# Patient Record
Sex: MALE | Race: ASIAN | NOT HISPANIC OR LATINO | Employment: FULL TIME | URBAN - METROPOLITAN AREA
[De-identification: names, ages, dates, MRNs, and addresses within clinical notes are randomized per-mention and may not be internally consistent; named-entity substitution may affect disease eponyms.]

---

## 2024-01-04 ENCOUNTER — APPOINTMENT (OUTPATIENT)
Dept: HEMATOLOGY/ONCOLOGY | Facility: HOSPITAL | Age: 69
End: 2024-01-04
Payer: MEDICARE

## 2024-03-07 ENCOUNTER — TELEMEDICINE (OUTPATIENT)
Dept: HEMATOLOGY/ONCOLOGY | Facility: HOSPITAL | Age: 69
End: 2024-03-07
Payer: MEDICARE

## 2024-03-07 DIAGNOSIS — C61 MALIGNANT NEOPLASM OF PROSTATE (MULTI): ICD-10-CM

## 2024-03-07 DIAGNOSIS — R97.21 RISING PSA FOLLOWING TREATMENT FOR MALIGNANT NEOPLASM OF PROSTATE: Primary | ICD-10-CM

## 2024-03-07 PROCEDURE — 99213 OFFICE O/P EST LOW 20 MIN: CPT | Performed by: INTERNAL MEDICINE

## 2024-03-07 NOTE — PROGRESS NOTES
Medical Oncology Out Patient Clinic Note    Patient's Name: Irvin Oliver  MRN: [id number]  Virtual Visit: YES    Reason for Visit: FU    HPI: 67 yo male known to us with [facility] with BCR on IAB  Now off ADT and with T recovery  PSA rising but pt is completely asymptomatic      Updated PMHx  None    Review of Systems  13 point review negative    LABS:    Feb 26, 2024 PSA 3.99 and T 219    A/P: BCR after local definitive therapy    Discussed significance of PSA changes and PSADT  Pt reluctant to initiate Rx  We have favored to get a PSA in 6 months - per pt request      Discussed importance of a healthier diet - offered to see Nutritional Services; Also discussed the importance of daily regular exercise (aerobic and resistance differences noted)  Offered to see Supportive Services if needed as well as integrative Oncology and Psychosocial Support    Sen Connell MD, FACP  Chief, Solid Tumor Oncology Division   Medical Oncology  Professor of Medicine and Urology  /[facility]  [facility]

## 2024-03-20 DIAGNOSIS — C61 PROSTATE CANCER (MULTI): ICD-10-CM

## 2024-09-05 ENCOUNTER — TELEMEDICINE (OUTPATIENT)
Dept: HEMATOLOGY/ONCOLOGY | Facility: HOSPITAL | Age: 69
End: 2024-09-05
Payer: MEDICARE

## 2024-09-05 DIAGNOSIS — R97.21 RISING PSA FOLLOWING TREATMENT FOR MALIGNANT NEOPLASM OF PROSTATE: ICD-10-CM

## 2024-09-05 DIAGNOSIS — C61 MALIGNANT NEOPLASM OF PROSTATE (MULTI): ICD-10-CM

## 2024-09-05 PROCEDURE — 99213 OFFICE O/P EST LOW 20 MIN: CPT | Performed by: INTERNAL MEDICINE

## 2024-09-05 NOTE — PROGRESS NOTES
Medical Oncology    Irvin Oliver  [id number]  9/5/2024      Rising PSA - 13 now  PSMA PET before we can make that decision  He still is adamant not to have therapy and even scans  Wishes to hold off on scans and get PSA in Jan 2025  Discussed pros and cons of waiting of additional imaging      Sen Connell MD, FACP  Chief, Solid Tumor Oncology Division   Medical Oncology  Professor of Medicine and Urology  /[facility]

## 2025-01-16 ENCOUNTER — APPOINTMENT (OUTPATIENT)
Dept: HEMATOLOGY/ONCOLOGY | Facility: HOSPITAL | Age: 70
End: 2025-01-16
Payer: MEDICARE